# Patient Record
Sex: MALE | Race: WHITE
[De-identification: names, ages, dates, MRNs, and addresses within clinical notes are randomized per-mention and may not be internally consistent; named-entity substitution may affect disease eponyms.]

---

## 2020-01-22 ENCOUNTER — HOSPITAL ENCOUNTER (EMERGENCY)
Dept: HOSPITAL 59 - ER | Age: 30
Discharge: HOME | End: 2020-01-22
Payer: COMMERCIAL

## 2020-01-22 DIAGNOSIS — R05: ICD-10-CM

## 2020-01-22 DIAGNOSIS — R11.2: ICD-10-CM

## 2020-01-22 DIAGNOSIS — J06.9: Primary | ICD-10-CM

## 2020-01-22 DIAGNOSIS — R19.7: ICD-10-CM

## 2020-01-22 LAB
APPEARANCE UR: CLEAR
BILIRUB UR-MCNC: NEGATIVE MG/DL
COLOR UR: YELLOW
GLUCOSE UR STRIP-MCNC: NEGATIVE MG/DL
KETONES UR QL STRIP: NEGATIVE
NITRITE UR QL STRIP: NEGATIVE
PROT UR QL STRIP: NEGATIVE
RBC # UR STRIP: NEGATIVE /UL
URINE LEUKOCYTE ESTERASE: NEGATIVE
UROBILINOGEN UR STRIP-ACNC: 0.2 E.U./DL (ref 0.2–1)

## 2020-01-22 PROCEDURE — 99284 EMERGENCY DEPT VISIT MOD MDM: CPT

## 2020-01-22 PROCEDURE — 71046 X-RAY EXAM CHEST 2 VIEWS: CPT

## 2020-01-22 PROCEDURE — 81003 URINALYSIS AUTO W/O SCOPE: CPT

## 2020-01-22 NOTE — EMERGENCY DEPARTMENT RECORD
History of Present Illness





- General


Chief complaint: Nausea, Vomiting, Diarrhea


Stated complaint: FEVER,DIARRHEA,


Time Seen by Provider: 20 14:55


Source: Patient


Mode of Arrival: Ambulatory


Limitations: No limitations





- History of Present Illness


Initial comments: 


The patient is here due to being ill for 4 days. He had had loose watery stools 

3 days ago which is improved now. For the last 4 days he also has had fevers, 

chills, body aches, and a deep cough. The patient did not have a flu shot and 

his son was diagnosed with Flu B 7 days ago. There has been no HA, neck pain or 

AP.





MD complaint: Diarrhea, Nausea, Vomiting


Onset/Timin


-: Days(s)


Associated Symptoms: Fever/chills





- Related Data


                                Home Medications











 Medication  Instructions  Recorded  Confirmed  Last Taken


 


Fluoxetine HCl [Prozac] 20 mg PO DAILY 20








                                  Previous Rx's











 Medication  Instructions  Recorded


 


Azithromycin [Zithromax] 250 mg PO ASDIR #6 tab 20











                                    Allergies











Allergy/AdvReac Type Severity Reaction Status Date / Time


 


Penicillins Allergy  RASH Verified 20 15:00














Travel Screening





- Travel/Exposure Within Last 30 Days


Have you traveled within the last 30 days?: No





- Travel/Exposure Within Last Year


Have you traveled outside the [place] in the last year?: No





- Additonal Travel Details


Have you been exposed to anyone with a communicable illness?: No





- Travel Symptoms


Symptom Screening: Headache, Diarrhea





Review of Systems


Constitutional: Reports: Fever, Malaise.  Denies: Chills


Eyes: Denies: Eye discharge


ENT: Reports: Congestion


Respiratory: Reports: Cough.  Denies: Dyspnea





Past Medical History





- SOCIAL HISTORY


Smoking Status: Never smoker


Alcohol Use: Occasional


Drug Use: None





- RESPIRATORY


Hx Respiratory Disorders: Yes


Hx Bronchitis: Yes


Hx Pneumonia: Yes





- CARDIOVASCULAR


Hx Cardio Disorders: No





- NEURO


Hx Neuro Disorders: No





- GI


Hx GI Disorders: No





- 


Hx Genitourinary Disorders: Yes


Hx Kidney Stones: Yes





- ENDOCRINE


Hx Endocrine Disorders: No





- MUSCULOSKELETAL


Hx Musculoskeletal Disorders: No





- PSYCH


Hx Psych Problems: Yes


Hx Anxiety: Yes


Hx Depression: Yes





- HEMATOLOGY/ONCOLOGY


Hx Hematology/Oncology Disorders: No





Family Medical History


Any Significant Family History?: No





Physical Exam





- General


General Appearance: Alert, Oriented x3, Cooperative, No acute distress





- Head


Head exam: Atraumatic, Normocephalic, Normal inspection





- Eye


Eye exam: Normal appearance, PERRL, EOMI





- ENT


Throat exam: Normal inspection.  negative: Tonsillar erythema, Tonsillar exudate





- Neck


Neck exam: Normal inspection, Full ROM.  negative: Tenderness





- Respiratory


Respiratory exam: Normal lung sounds bilaterally.  negative: Respiratory 

distress





- Cardiovascular


Cardiovascular Exam: Regular rate, Normal rhythm, Normal heart sounds





- GI/Abdominal


GI/Abdominal exam: Soft, Normal bowel sounds.  negative: Tenderness





- Extremities


Extremities exam: Normal inspection, Full ROM, Normal capillary refill.  

negative: Tenderness





- Neurological


Neurological exam: Alert, Normal gait.  negative: Abnormal gait, Motor sensory 

deficit





- Psychiatric


Psychiatric exam: negative: Anxious





Course





                                   Vital Signs











  20





  14:52


 


Temperature 98.0 F


 


Pulse Rate [ 98 H





Pulse Ox Probe] 


 


Respiratory 18





Rate 


 


Blood Pressure 122/74





[Left Arm] 


 


Pulse Ox 98














- Reevaluation(s)


Reevaluation #1: 


The patient is doing very well at this time. I did discuss the neg UA and the 

xray with possible early infiltrate. I do believe his symptoms are from 

Influenza but due to the xray report we will place the patient on a Zpak. He is 

to see his PCP next week for recheck and is to return to the ER for any 

worsening symptoms.


20 16:07








Medical Decision Making





- Data Complexity


MDM Data: Labs Ordered and/or Reviewed (UA: Neg for ketones.), X-Ray Ordered 

and/or Reviewed





- Radiology Data


Radiology results: Report reviewed (CXR: Possible early R perihilar infiltrate.)





Disposition


Disposition: Discharge


Clinical Impression: 


Upper respiratory infection


Qualifiers:


 URI type: unspecified URI Qualified Code(s): J06.9 - Acute upper respiratory 

infection, unspecified





Disposition: Home, Self-Care


Condition: (2) Stable


Instructions:  Acute Nausea and Vomiting (ED)


Additional Instructions: 


Please use Tylenol or Motrin for fever and body aches and take the Zpak as 

directed. Please see your family doctor next week if not better and return to 

the ER for any worsening symptoms.


Prescriptions: 


Azithromycin [Zithromax] 250 mg PO ASDIR #6 tab


Forms:  Patient Portal Access


Time of Disposition: 16:05





Quality





- Quality Measures


Quality Measures: N/A





- Blood Pressure Screening


View Details: Yes


Does Patient Have Any of the Following: No


Blood Pressure Classification: Pre-Hypertensive BP Reading


Systolic Measurement: 122


Diastolic Measurement: 74


Screening for High Blood Pressure: < Pre-Hypertensive BP, F/U Documented > 

[]


Pre-Hypertensive Follow-up Interventions: Referral to alternative/primary care 

provider.

## 2020-01-22 NOTE — RADIOLOGY REPORT
EXAMINATION: Two View Chest Radiographs

EXAM DATE:  1/22/2020 3:25 PM



TECHNIQUE:  Frontal and lateral views



INDICATION:  cough

COMPARISON:  None



ENCOUNTER: Not applicable

_________________________



FINDINGS:



The heart, mediastinum, and pulmonary vasculature are normal. Asymmetric increased markings right per
ihilar without old studies for comparison. No pneumothorax or pleural effusion.



IMPRESSION:



Asymmetric increased markings right perihilar may represent early infiltrate.



Dictated by: Miriam Cullen MD on 1/22/2020 3:39 PM.

Electronically signed by: Miriam Cullen MD on 1/22/2020 3:44 PM.